# Patient Record
Sex: FEMALE | Race: WHITE | NOT HISPANIC OR LATINO | URBAN - METROPOLITAN AREA
[De-identification: names, ages, dates, MRNs, and addresses within clinical notes are randomized per-mention and may not be internally consistent; named-entity substitution may affect disease eponyms.]

---

## 2022-04-08 ENCOUNTER — OFFICE VISIT (OUTPATIENT)
Dept: URBAN - METROPOLITAN AREA CLINIC 113 | Facility: CLINIC | Age: 27
End: 2022-04-08
Payer: COMMERCIAL

## 2022-04-08 ENCOUNTER — WEB ENCOUNTER (OUTPATIENT)
Dept: URBAN - METROPOLITAN AREA CLINIC 113 | Facility: CLINIC | Age: 27
End: 2022-04-08

## 2022-04-08 ENCOUNTER — LAB OUTSIDE AN ENCOUNTER (OUTPATIENT)
Dept: URBAN - METROPOLITAN AREA CLINIC 113 | Facility: CLINIC | Age: 27
End: 2022-04-08

## 2022-04-08 ENCOUNTER — DASHBOARD ENCOUNTERS (OUTPATIENT)
Age: 27
End: 2022-04-08

## 2022-04-08 VITALS
DIASTOLIC BLOOD PRESSURE: 88 MMHG | HEIGHT: 66 IN | SYSTOLIC BLOOD PRESSURE: 131 MMHG | TEMPERATURE: 97.1 F | WEIGHT: 171 LBS | BODY MASS INDEX: 27.48 KG/M2 | RESPIRATION RATE: 18 BRPM | HEART RATE: 92 BPM

## 2022-04-08 DIAGNOSIS — R11.0 NAUSEA: ICD-10-CM

## 2022-04-08 DIAGNOSIS — R10.13 EPIGASTRIC ABDOMINAL PAIN: ICD-10-CM

## 2022-04-08 PROCEDURE — 99204 OFFICE O/P NEW MOD 45 MIN: CPT | Performed by: NURSE PRACTITIONER

## 2022-04-08 RX ORDER — INSULIN LISPRO 100 [IU]/ML
AS DIRECTED INJECTION, SOLUTION INTRAVENOUS; SUBCUTANEOUS
Status: ACTIVE | COMMUNITY

## 2022-04-08 RX ORDER — SUCRALFATE 1 G/1
1 TABLET TABLET ORAL
Status: ACTIVE | COMMUNITY

## 2022-04-08 RX ORDER — PANTOPRAZOLE SODIUM 40 MG/1
1 TABLET TABLET, DELAYED RELEASE ORAL ONCE A DAY
Qty: 30 | Refills: 5 | OUTPATIENT
Start: 2022-04-08

## 2022-04-08 RX ORDER — FAMOTIDINE 20 MG/1
1 TABLET TABLET, FILM COATED ORAL TWICE A DAY
Status: ACTIVE | COMMUNITY

## 2022-04-08 NOTE — HPI-TODAY'S VISIT:
She reports onset of burning in her abdomen and mild nausea in January.  This has been persistent.  She indicates burning in the epigastric area.  She further describes it as a "feeling like so much acid."  It occurs postprandially.  In the past, it was diet dependent and now she is having consistent burning after most meals.  She also reports associated mild nausea.  Gaviscon has been somewhat helpful.  She denies vomiting, heartburn, dysphagia, or any other abdominal symptoms.  She was taking Advil or ibuprofen as needed prior to symptom onset.  Now, she is only taking Tylenol.  She has been to urgent care for evaluation and, due to severe, unrelenting pain, she went to the emergency department at [facility] a week ago.  Labs and ultrasound were performed.  She states that the ultrasound technician remarked that her pylorus was prominent on ultrasound and otherwise it was negative.  The reports are unavailable.  She was prescribed famotidine 20 mg twice a day and sucralfate 1 g 4 times a day in the emergency department.  She reports some symptom improvement stating that "it has been kind of helpful and the episodes are not as bad."  She admits mild weight loss.  She denies a family history of GI cancers.  She reports her last A1c months ago was 7.9.  Her levels have been "okay."

## 2022-04-12 ENCOUNTER — TELEPHONE ENCOUNTER (OUTPATIENT)
Dept: URBAN - METROPOLITAN AREA CLINIC 113 | Facility: CLINIC | Age: 27
End: 2022-04-12

## 2022-04-13 ENCOUNTER — TELEPHONE ENCOUNTER (OUTPATIENT)
Dept: URBAN - METROPOLITAN AREA CLINIC 72 | Facility: CLINIC | Age: 27
End: 2022-04-13

## 2022-04-14 ENCOUNTER — OFFICE VISIT (OUTPATIENT)
Dept: URBAN - METROPOLITAN AREA SURGERY CENTER 25 | Facility: SURGERY CENTER | Age: 27
End: 2022-04-14

## 2022-05-12 PROBLEM — 422587007: Status: ACTIVE | Noted: 2022-04-08

## 2022-05-12 PROBLEM — 79922009: Status: ACTIVE | Noted: 2022-04-08

## 2022-05-23 ENCOUNTER — TELEPHONE ENCOUNTER (OUTPATIENT)
Dept: URBAN - METROPOLITAN AREA CLINIC 72 | Facility: CLINIC | Age: 27
End: 2022-05-23

## 2022-05-25 ENCOUNTER — OFFICE VISIT (OUTPATIENT)
Dept: URBAN - METROPOLITAN AREA MEDICAL CENTER 40 | Facility: MEDICAL CENTER | Age: 27
End: 2022-05-25